# Patient Record
Sex: MALE | NOT HISPANIC OR LATINO | Employment: FULL TIME
[De-identification: names, ages, dates, MRNs, and addresses within clinical notes are randomized per-mention and may not be internally consistent; named-entity substitution may affect disease eponyms.]

---
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
## 2024-02-14 ENCOUNTER — OFFICE VISIT (OUTPATIENT)
Dept: RHEUMATOLOGY | Facility: CLINIC | Age: 19
End: 2024-02-14
Payer: COMMERCIAL

## 2024-02-14 VITALS
BODY MASS INDEX: 31.54 KG/M2 | SYSTOLIC BLOOD PRESSURE: 131 MMHG | HEART RATE: 72 BPM | HEIGHT: 69 IN | DIASTOLIC BLOOD PRESSURE: 81 MMHG | WEIGHT: 212.96 LBS

## 2024-02-14 DIAGNOSIS — Z01.00 ROUTINE EYE EXAM: ICD-10-CM

## 2024-02-14 DIAGNOSIS — M08.80 JIA (JUVENILE IDIOPATHIC ARTHRITIS) (H): Primary | ICD-10-CM

## 2024-02-14 DIAGNOSIS — Z79.1 NSAID LONG-TERM USE: ICD-10-CM

## 2024-02-14 PROCEDURE — 99214 OFFICE O/P EST MOD 30 MIN: CPT | Performed by: STUDENT IN AN ORGANIZED HEALTH CARE EDUCATION/TRAINING PROGRAM

## 2024-02-14 NOTE — PATIENT INSTRUCTIONS
Reid Zelaya saw Dr. Chan on February 14, 2024 for a follow-up  visit regarding his ankle arthritis.     Overall Assessment: Reid is doing well.     Plan:    Labs: No labs today    Imaging: None    Medications: Continue meloxicam daily    Referrals: None    Eye exams: You need a slit lamp eye exam. You're overdue    Follow up with us in: 3-4 months in clinic     Thank you for allowing me to participate in Reid's care.  If there are any questions or concerns, please do not hesitate to contact us at the phone numbers below.    Viridiana Chan MD, MPH   of Pediatrics  Division of Rheumatology, Allergy, and Immunology   Thank you for choosing [facility]. It was a pleasure to see you for your office visit today.     If you have any questions or scheduling needs during regular office hours, please call: [phone]  If urgent concerns arise after hours, you can call [phone] and ask to speak to the pediatric specialist on call.   If you need to schedule Imaging/Radiology tests, please call: [phone]  [employer] messages are for routine communication and questions and are usually answered within 48-72 hours. If you have an urgent concern or require sooner response, please call us.  Outside lab and imaging results should be faxed to [phone].  If you go to a lab outside of [facility] we will not automatically get those results. You will need to ask to have them faxed.   You may receive a survey regarding your experience with the clinic today. We would appreciate your feedback.   We encourage to you make your follow-up today to ensure a timely appointment. If you are unable to do so please reach out to [phone] as soon as possible.       If you had any blood work, imaging or other tests completed today:  Normal test results will be mailed to your home address in a letter.  Abnormal results will be communicated to you via phone call/letter.  Please allow up to 1-2 weeks for  processing and interpretation of most lab work.

## 2024-02-14 NOTE — LETTER
2/14/2024      RE: Reid Zelaya  [address] [facility] [address]     Dear Colleague,    Thank you for the opportunity to participate in the care of your patient, Reid Zelaya, at the [facility] [place] at [facility]. Please see a copy of my visit note below.        Rheumatology History:   Date of symptom onset: 5/1/2021  Date of first visit to center: 1/31/2022  Date of KEDAR diagnosis: 1/31/2022  ILAR category: persistent oligoarticular  SEBASTIAN Status: negative   RF Status: negative   CCP Status: not done   HLA-B27 Status: negative        Ophthalmology History:   Iritis/Uveitis Comorbidity: no   Date of last eye exam: 1/1/2022          Medications:   As of completion of this visit:  Current Outpatient Medications   Medication Sig Dispense Refill    meloxicam (MOBIC) 15 MG tablet Take 1 tablet (15 mg) by mouth daily 90 tablet 3    adalimumab (HUMIRA *CF*) 40 MG/0.4ML prefilled syringe kit Inject 0.4 mLs (40 mg) Subcutaneous every 7 days (Patient not taking: Reported on 11/27/2023) 4 each 11         Problem list:     Patient Active Problem List    Diagnosis Date Noted    Adalimumab (Humira) long-term use 11/08/2022     Priority: Medium    Long term methotrexate user 04/25/2022     Priority: Medium    At risk for uveitis 04/25/2022     Priority: Medium     Needs a yearly eye exam to monitor for uveitis      KEDAR (juvenile idiopathic arthritis) (H) 02/02/2022     Priority: Medium    NSAID long-term use 02/02/2022     Priority: Medium          Subjective:   Reid is a 18 year old male who was seen in Pediatric Rheumatology clinic today for follow up.  Reid was last seen in our clinic on 11/27/23 and returns today accompanied by his dad.  The primary encounter diagnosis was KEDAR (juvenile idiopathic arthritis) (H). Diagnoses of At risk for uveitis and NSAID long-term use were also pertinent to this visit.      Reid is doing  "better since restarting his daily meloxicam. He is no longer having problems at work. He is not having any side effects from the meloxicam. He has not had return of swelling of his feet. He does not have any new joint involvement. He does not think he needs to restart Humira as the meloxicam is working well.     He has not had a recent eye exam.     Prescribed medications have been administered regularly, without missed doses.  Medications have been tolerated well, without side effects.    Comprehensive Review of Systems is otherwise negative.         Examination:   Blood pressure 131/81, pulse 72, height 1.762 m (5' 9.37\"), weight 96.6 kg (212 lb 15.4 oz).  96 %ile (Z= 1.71) based on CDC (Boys, 2-20 Years) weight-for-age data using vitals from 2/14/2024.  Blood pressure %juan jose are not available for patients who are 18 years or older.  Body surface area is 2.17 meters squared.     Gen: Well appearing; cooperative. No acute distress.  Head: Normal head and hair.  Eyes: No scleral injection, pupils normal.  Nose: No deformity, no rhinorrhea or congestion. No sores.  Mouth: Normal teeth and gums. Moist mucus membranes.   Lymphatics: No cervical, supraclavicular lymphadenopathy.  Lungs: No increased work of breathing. Lungs clear to auscultation bilaterally.  Heart: Regular rate and rhythm. No murmurs. Normal S1/S2. Normal peripheral perfusion.  Abdomen: Soft, non-tender, non-distended. No hepatosplenomegaly.  Skin: No rashes or lesions.  Neuro: Alert, interactive. Answers questions appropriately. CN intact. Normal strength and tone.   MSK: No evidence of current synovitis/arthritis of the cervical spine, TMJ, sternoclavicular, acromioclavicular, glenohumeral, elbow, wrists, finger, sacroiliac, hip, knee, ankle, or toe joints. No tendonitis or bursitis. No enthesitis.  No leg length discrepancy. Gait is normal with walking and running.          Last Lab Results:     No visits with results within 1 Day(s) from this visit. "   Latest known visit with results is:   Office Visit on 11/27/2023   Component Date Value    Creatinine 11/27/2023 0.75     GFR Estimate 11/27/2023 >90     Protein Total 11/27/2023 7.3     Albumin 11/27/2023 4.6     Bilirubin Total 11/27/2023 0.6     Alkaline Phosphatase 11/27/2023 90     AST 11/27/2023 23     ALT 11/27/2023 25     Bilirubin Direct 11/27/2023 <0.20     Color Urine 11/27/2023 Yellow     Appearance Urine 11/27/2023 Clear     Glucose Urine 11/27/2023 Negative     Bilirubin Urine 11/27/2023 Negative     Ketones Urine 11/27/2023 Negative     Specific Gravity Urine 11/27/2023 1.025     Blood Urine 11/27/2023 Negative     pH Urine 11/27/2023 7.5 (H)     Protein Albumin Urine 11/27/2023 Negative     Urobilinogen Urine 11/27/2023 2.0     Nitrite Urine 11/27/2023 Negative     Leukocyte Esterase Urine 11/27/2023 Negative     Mucus Urine 11/27/2023 Present (A)     RBC Urine 11/27/2023 1     WBC Urine 11/27/2023 2     WBC Count 11/27/2023 6.7     RBC Count 11/27/2023 5.42     Hemoglobin 11/27/2023 14.7     Hematocrit 11/27/2023 43.1     MCV 11/27/2023 80     MCH 11/27/2023 27.1     MCHC 11/27/2023 34.1     RDW 11/27/2023 12.7     Platelet Count 11/27/2023 307     % Neutrophils 11/27/2023 52     % Lymphocytes 11/27/2023 37     % Monocytes 11/27/2023 8     % Eosinophils 11/27/2023 2     % Basophils 11/27/2023 1     % Immature Granulocytes 11/27/2023 0     NRBCs per 100 WBC 11/27/2023 0     Absolute Neutrophils 11/27/2023 3.5     Absolute Lymphocytes 11/27/2023 2.4     Absolute Monocytes 11/27/2023 0.6     Absolute Eosinophils 11/27/2023 0.2     Absolute Basophils 11/27/2023 0.0     Absolute Immature Granul* 11/27/2023 0.0     Absolute NRBCs 11/27/2023 0.0           Assessment:   Reid is a 18 year old male with 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     Reid's presentation has always been a little bit atypical with his predominant physical exam  finding being decreased ROM of his ankles. His exam is normal today. He is reportedly not having pain since restarting meloxicam therapy. In the past, he's needed Humira to help resolve symptoms, and it's possible that over time, his arthritis will flare again and require restarting Humira. However, today, he's doing well. We have had evidence with past flares that Reid's increase in symptoms does correspond with increased findings on MRI, so we can use pain/symptoms as a guide for current flare activity.     Labs performed in November do not reflect toxicity due to medication.         Plan:   Labs: No labs today. Next labs due in May 2024.   Imaging: None  Medications: Continue meloxicam daily. I am keeping the Humira prescription active in case symptoms increase  Referrals: None  Eye exams: Overdue for a slit lamp eye exam  Return in about 3 months (around 5/14/2024).     If there are any new questions or concerns, I would be glad to help and can be reached through our main office at [phone] or our paging  at [phone].  Assessment requiring an independent historian(s) - family - dad  Prescription drug management  30 minutes spent by me on the date of the encounter doing chart review, history and exam, documentation and further activities per the note     Viridiana Chan MD MPH   of Pediatrics  Division of Rheumatology, Allergy, and Immunology     CC  Patient Care Team:  Na Oneal MD as PCP - General    Copy to patient  EMANUEL MORRIS, RAUL  [address] [facility] [address]

## 2024-02-14 NOTE — PROGRESS NOTES
Rheumatology History:   Date of symptom onset: 5/1/2021  Date of first visit to center: 1/31/2022  Date of KEDAR diagnosis: 1/31/2022  ILAR category: persistent oligoarticular  SEBASTIAN Status: negative   RF Status: negative   CCP Status: not done   HLA-B27 Status: negative        Ophthalmology History:   Iritis/Uveitis Comorbidity: no   Date of last eye exam: 1/1/2022          Medications:   As of completion of this visit:  Current Outpatient Medications   Medication Sig Dispense Refill    meloxicam (MOBIC) 15 MG tablet Take 1 tablet (15 mg) by mouth daily 90 tablet 3    adalimumab (HUMIRA *CF*) 40 MG/0.4ML prefilled syringe kit Inject 0.4 mLs (40 mg) Subcutaneous every 7 days (Patient not taking: Reported on 11/27/2023) 4 each 11         Problem list:     Patient Active Problem List    Diagnosis Date Noted    Adalimumab (Humira) long-term use 11/08/2022     Priority: Medium    Long term methotrexate user 04/25/2022     Priority: Medium    At risk for uveitis 04/25/2022     Priority: Medium     Needs a yearly eye exam to monitor for uveitis      KEDAR (juvenile idiopathic arthritis) (H) 02/02/2022     Priority: Medium    NSAID long-term use 02/02/2022     Priority: Medium          Subjective:   Reid is a 18 year old male who was seen in Pediatric Rheumatology clinic today for follow up.  Reid was last seen in our clinic on 11/27/23 and returns today accompanied by his dad.  The primary encounter diagnosis was KEDAR (juvenile idiopathic arthritis) (H). Diagnoses of At risk for uveitis and NSAID long-term use were also pertinent to this visit.      Reid is doing better since restarting his daily meloxicam. He is no longer having problems at work. He is not having any side effects from the meloxicam. He has not had return of swelling of his feet. He does not have any new joint involvement. He does not think he needs to restart Humira as the meloxicam is working well.     He has not had a recent eye exam.     Prescribed  "medications have been administered regularly, without missed doses.  Medications have been tolerated well, without side effects.    Comprehensive Review of Systems is otherwise negative.         Examination:   Blood pressure 131/81, pulse 72, height 1.762 m (5' 9.37\"), weight 96.6 kg (212 lb 15.4 oz).  96 %ile (Z= 1.71) based on [facility] (Boys, 2-20 Years) weight-for-age data using vitals from 2/14/2024.  Blood pressure %juan jose are not available for patients who are 18 years or older.  Body surface area is 2.17 meters squared.     Gen: Well appearing; cooperative. No acute distress.  Head: Normal head and hair.  Eyes: No scleral injection, pupils normal.  Nose: No deformity, no rhinorrhea or congestion. No sores.  Mouth: Normal teeth and gums. Moist mucus membranes.   Lymphatics: No cervical, supraclavicular lymphadenopathy.  Lungs: No increased work of breathing. Lungs clear to auscultation bilaterally.  Heart: Regular rate and rhythm. No murmurs. Normal S1/S2. Normal peripheral perfusion.  Abdomen: Soft, non-tender, non-distended. No hepatosplenomegaly.  Skin: No rashes or lesions.  Neuro: Alert, interactive. Answers questions appropriately. CN intact. Normal strength and tone.   MSK: No evidence of current synovitis/arthritis of the cervical spine, TMJ, sternoclavicular, acromioclavicular, glenohumeral, elbow, wrists, finger, sacroiliac, hip, knee, ankle, or toe joints. No tendonitis or bursitis. No enthesitis.  No leg length discrepancy. Gait is normal with walking and running.          Last Lab Results:     No visits with results within 1 Day(s) from this visit.   Latest known visit with results is:   Office Visit on 11/27/2023   Component Date Value    Creatinine 11/27/2023 0.75     GFR Estimate 11/27/2023 >90     Protein Total 11/27/2023 7.3     Albumin 11/27/2023 4.6     Bilirubin Total 11/27/2023 0.6     Alkaline Phosphatase 11/27/2023 90     AST 11/27/2023 23     ALT 11/27/2023 25     Bilirubin Direct 11/27/2023 " <0.20     Color Urine 11/27/2023 Yellow     Appearance Urine 11/27/2023 Clear     Glucose Urine 11/27/2023 Negative     Bilirubin Urine 11/27/2023 Negative     Ketones Urine 11/27/2023 Negative     Specific Gravity Urine 11/27/2023 1.025     Blood Urine 11/27/2023 Negative     pH Urine 11/27/2023 7.5 (H)     Protein Albumin Urine 11/27/2023 Negative     Urobilinogen Urine 11/27/2023 2.0     Nitrite Urine 11/27/2023 Negative     Leukocyte Esterase Urine 11/27/2023 Negative     Mucus Urine 11/27/2023 Present (A)     RBC Urine 11/27/2023 1     WBC Urine 11/27/2023 2     WBC Count 11/27/2023 6.7     RBC Count 11/27/2023 5.42     Hemoglobin 11/27/2023 14.7     Hematocrit 11/27/2023 43.1     MCV 11/27/2023 80     MCH 11/27/2023 27.1     MCHC 11/27/2023 34.1     RDW 11/27/2023 12.7     Platelet Count 11/27/2023 307     % Neutrophils 11/27/2023 52     % Lymphocytes 11/27/2023 37     % Monocytes 11/27/2023 8     % Eosinophils 11/27/2023 2     % Basophils 11/27/2023 1     % Immature Granulocytes 11/27/2023 0     NRBCs per 100 WBC 11/27/2023 0     Absolute Neutrophils 11/27/2023 3.5     Absolute Lymphocytes 11/27/2023 2.4     Absolute Monocytes 11/27/2023 0.6     Absolute Eosinophils 11/27/2023 0.2     Absolute Basophils 11/27/2023 0.0     Absolute Immature Granul* 11/27/2023 0.0     Absolute NRBCs 11/27/2023 0.0           Assessment:   Reid is a 18 year old male with 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     Reid's presentation has always been a little bit atypical with his predominant physical exam finding being decreased ROM of his ankles. His exam is normal today. He is reportedly not having pain since restarting meloxicam therapy. In the past, he's needed Humira to help resolve symptoms, and it's possible that over time, his arthritis will flare again and require restarting Humira. However, today, he's doing well. We have had evidence with past flares  that Reid's increase in symptoms does correspond with increased findings on MRI, so we can use pain/symptoms as a guide for current flare activity.     Labs performed in November do not reflect toxicity due to medication.         Plan:   Labs: No labs today. Next labs due in May 2024.   Imaging: None  Medications: Continue meloxicam daily. I am keeping the Humira prescription active in case symptoms increase  Referrals: None  Eye exams: Overdue for a slit lamp eye exam  Return in about 3 months (around 5/14/2024).     If there are any new questions or concerns, I would be glad to help and can be reached through our main office at [phone] or our paging  at [phone].  Assessment requiring an independent historian(s) - family - dad  Prescription drug management  30 minutes spent by me on the date of the encounter doing chart review, history and exam, documentation and further activities per the note     Viridiana Martell MD MPH   of Pediatrics  Division of Rheumatology, Allergy, and Immunology     CC  Patient Care Team:  Na Oneal MD as PCP - General  Dr. Taylor as Other (see comments) (Podiatry)  Viridiana Martell MD as Fellow (Student in organized health care education/training program)  Viridiana Martell MD as Assigned Pediatric Specialist Provider  VIRIDIANA MARTELL    Copy to patient  EMANUEL MORRISSLYH  [address] [facility] [address]

## 2024-06-11 ENCOUNTER — TELEPHONE (OUTPATIENT)
Dept: RHEUMATOLOGY | Facility: CLINIC | Age: 19
End: 2024-06-11
Payer: COMMERCIAL

## 2024-06-11 NOTE — TELEPHONE ENCOUNTER
06/11 1st attempt. LVM to schedule an overdue follow up visit with the provider.    Offered 06/26 at 11:30 in [facility]    Please assist in scheduling a follow up visit if the patient calls back.    Thanks

## 2024-06-30 ENCOUNTER — HEALTH MAINTENANCE LETTER (OUTPATIENT)
Age: 19
End: 2024-06-30

## 2024-07-02 NOTE — TELEPHONE ENCOUNTER
07/02 2nd attempt. LVM to schedule an overdue follow up visit with the provider.    Offered 07/10 at 9:30 AM in [facility]    Please assist in scheduling a follow up visit if the family calls back.    Thanks

## 2024-10-04 ENCOUNTER — HOSPITAL ENCOUNTER (EMERGENCY)
Facility: CLINIC | Age: 19
Discharge: HOME | End: 2024-10-04
Attending: STUDENT IN AN ORGANIZED HEALTH CARE EDUCATION/TRAINING PROGRAM | Admitting: STUDENT IN AN ORGANIZED HEALTH CARE EDUCATION/TRAINING PROGRAM
Payer: COMMERCIAL

## 2024-10-04 VITALS
DIASTOLIC BLOOD PRESSURE: 93 MMHG | HEART RATE: 69 BPM | HEIGHT: 69 IN | BODY MASS INDEX: 32.58 KG/M2 | RESPIRATION RATE: 18 BRPM | WEIGHT: 220 LBS | TEMPERATURE: 98 F | OXYGEN SATURATION: 100 % | SYSTOLIC BLOOD PRESSURE: 136 MMHG

## 2024-10-04 DIAGNOSIS — S05.02XA ABRASION OF LEFT CORNEA, INITIAL ENCOUNTER: ICD-10-CM

## 2024-10-04 PROCEDURE — 99283 EMERGENCY DEPT VISIT LOW MDM: CPT | Performed by: STUDENT IN AN ORGANIZED HEALTH CARE EDUCATION/TRAINING PROGRAM

## 2024-10-04 RX ORDER — TETRACAINE HYDROCHLORIDE 5 MG/ML
1-2 SOLUTION OPHTHALMIC ONCE
Status: DISCONTINUED | OUTPATIENT
Start: 2024-10-04 | End: 2024-10-04 | Stop reason: HOSPADM

## 2024-10-04 RX ORDER — OFLOXACIN 3 MG/ML
2 SOLUTION/ DROPS OPHTHALMIC
Status: SHIPPED
Start: 2024-10-04 | End: 2024-10-11

## 2024-10-04 RX ORDER — OFLOXACIN 3 MG/ML
2 SOLUTION/ DROPS OPHTHALMIC ONCE
Status: DISCONTINUED | OUTPATIENT
Start: 2024-10-04 | End: 2024-10-04 | Stop reason: HOSPADM

## 2024-10-04 ASSESSMENT — VISUAL ACUITY
OD: 20/20
OS: 20/25

## 2024-10-04 ASSESSMENT — COLUMBIA-SUICIDE SEVERITY RATING SCALE - C-SSRS
6. HAVE YOU EVER DONE ANYTHING, STARTED TO DO ANYTHING, OR PREPARED TO DO ANYTHING TO END YOUR LIFE?: NO
2. HAVE YOU ACTUALLY HAD ANY THOUGHTS OF KILLING YOURSELF IN THE PAST MONTH?: NO
1. IN THE PAST MONTH, HAVE YOU WISHED YOU WERE DEAD OR WISHED YOU COULD GO TO SLEEP AND NOT WAKE UP?: NO

## 2024-10-04 ASSESSMENT — ACTIVITIES OF DAILY LIVING (ADL): ADLS_ACUITY_SCORE: 35

## 2024-10-04 NOTE — Clinical Note
Reid Garcia was seen and treated in our emergency department on 10/4/2024.  He may return to work on 10/06/2024.       If you have any questions or concerns, please don't hesitate to call.      Dilan West MD

## 2024-10-05 NOTE — ED TRIAGE NOTES
Pulling wire in garage last night and wire to L eye. C/o redness and irritation today.      Triage Assessment (Adult)       Row Name 10/04/24 2046          Triage Assessment    Airway WDL WDL        Respiratory WDL    Respiratory WDL WDL        Cardiac WDL    Cardiac WDL WDL

## 2024-10-05 NOTE — DISCHARGE INSTRUCTIONS
You do have a small scratch to your left eye.  This will require antibiotic eyedrops for the next week.  Take them as instructed for the full duration of treatment.    Use Tylenol/ibuprofen for any additional discomfort.  Follow-up with your eye doctor, primary physician for reevaluation.  Return to the emergency department sooner for any new or worsening symptoms.

## 2024-10-05 NOTE — ED PROVIDER NOTES
History     Chief Complaint   Patient presents with    Eye Problem     HPI  Reid Zelaya is a 19 year old male with no relevant medical history who presents for evaluation of an eye injury.  Patient was doing some electrical work, pulling wire in her garage last night when the wire flew forward and struck him in the left eye.  This caused immediate pain and tearing, but he denies any significant vision changes or facial trauma.  Patient was able to sleep without too much difficulty, but he noted some watery discharge today and diffuse redness to the conjunctiva.  Patient otherwise denies having any other injuries or complaints.    Allergies:  Allergies   Allergen Reactions    Sulfa Antibiotics Swelling    Amoxicillin Hives and Rash     Problem List:    Patient Active Problem List    Diagnosis Date Noted    Adalimumab (Humira) long-term use 11/08/2022     Priority: Medium    Long term methotrexate user 04/25/2022     Priority: Medium    At risk for uveitis 04/25/2022     Priority: Medium     Needs a yearly eye exam to monitor for uveitis      KEDAR (juvenile idiopathic arthritis) (H) 02/02/2022     Priority: Medium    NSAID long-term use 02/02/2022     Priority: Medium      Past Medical History:    Past Medical History:   Diagnosis Date    ADHD (attention deficit hyperactivity disorder)     Chronic pain of both feet 2/2/2022     Past Surgical History:    History reviewed. No pertinent surgical history.    Family History:    Family History   Problem Relation Age of Onset    Cataracts Maternal Grandmother         congenital    Diabetes Type 1 Paternal Aunt      Social History:  Marital Status:  Single [1]  Social History     Tobacco Use    Smoking status: Never     Passive exposure: Never    Smokeless tobacco: Never      Medications:    ofloxacin (OCUFLOX) 0.3 % ophthalmic solution  adalimumab (HUMIRA *CF*) 40 MG/0.4ML prefilled syringe kit  meloxicam (MOBIC) 15 MG tablet      Review of Systems   All other systems  "reviewed and are negative.  See HPI.    Physical Exam   BP: (!) 136/93  Pulse: 69  Temp: 98  F (36.7  C)  Resp: 18  Height: 175.3 cm (5' 9\")  Weight: 99.8 kg (220 lb)  SpO2: 100 %    Physical Exam  Vitals and nursing note reviewed.   Constitutional:       General: He is not in acute distress.     Appearance: Normal appearance. He is not toxic-appearing.   HENT:      Head: Normocephalic and atraumatic.      Nose: Nose normal.      Mouth/Throat:      Mouth: Mucous membranes are moist.   Eyes:      General: No scleral icterus.     Extraocular Movements: Extraocular movements intact.      Pupils: Pupils are equal, round, and reactive to light.        Comments: There is mild generalized conjunctival injection/erythema to the left eye.  Otherwise normal on visual inspection.  No evidence of globe rupture or pupillary defect.  Extraocular movements are full and nonpainful, no proptosis.  On Woods lamp examination with fluorescein, patient does have a 2 to 3 mm corneal abrasion just below the pupil of the left eye.  No other evidence of foreign body or abrasion/ulceration.    Visual acuity 20/20 on the right, 20/25 on the left.   Cardiovascular:      Rate and Rhythm: Normal rate and regular rhythm.      Heart sounds: Normal heart sounds.   Pulmonary:      Effort: Pulmonary effort is normal. No respiratory distress.      Breath sounds: Normal breath sounds.   Abdominal:      Palpations: Abdomen is soft.      Tenderness: There is no abdominal tenderness.   Musculoskeletal:         General: No deformity. Normal range of motion.      Cervical back: Neck supple.   Skin:     General: Skin is warm.      Capillary Refill: Capillary refill takes less than 2 seconds.      Coloration: Skin is not pale.      Findings: No erythema or rash.   Neurological:      General: No focal deficit present.      Mental Status: He is alert and oriented to person, place, and time.      Cranial Nerves: No cranial nerve deficit.      Motor: No weakness. "   Psychiatric:         Mood and Affect: Mood normal.       ED Course        Procedures            No results found for this or any previous visit (from the past 24 hour(s)).    Medications   tetracaine (PONTOCAINE) 0.5 % ophthalmic solution 1-2 drop (has no administration in time range)   ofloxacin (OCUFLOX) 0.3 % ophthalmic solution 2 drop (has no administration in time range)     Assessments & Plan (with Medical Decision Making)     I have reviewed the nursing notes.    I have reviewed the findings, diagnosis, plan and need for follow up with the patient.  Medical Decision Making  Reid Zelaya is a 19 year old male with no relevant medical history who presents for evaluation of an eye injury.  Mild hypertension on arrival, vitals otherwise normal.  Exam is significant for mild generalized injection/erythema to the left conjunctiva with no evidence of globe injury, pupillary defect, foreign body, or proptosis.  He did have a 2 to 3 mm corneal abrasion on the left as described and pictured above.  No evidence of foreign body or other abnormalities.  Patient will be discharged home with antibiotic eyedrops, instructed to use Tylenol/ibuprofen for discomfort.  He does not wear contact lenses.  Patient will follow-up with his PCP/eye doctor for follow-up and agrees to return sooner for any new or worsening symptoms.    Discharge Medication List as of 10/4/2024  9:24 PM        START taking these medications    Details   ofloxacin (OCUFLOX) 0.3 % ophthalmic solution Place 2 drops Into the left eye every 2 hours (while awake) for 7 days., No Print Out           Final diagnoses:   Abrasion of left cornea, initial encounter     10/4/2024   [facility] EMERGENCY DEPT       Dilan West MD  10/04/24 2198

## 2024-12-03 DIAGNOSIS — M08.80 JIA (JUVENILE IDIOPATHIC ARTHRITIS) (H): ICD-10-CM

## 2024-12-03 RX ORDER — MELOXICAM 15 MG/1
15 TABLET ORAL DAILY
Qty: 30 TABLET | Refills: 0 | Status: SHIPPED | OUTPATIENT
Start: 2024-12-03

## 2025-02-12 DIAGNOSIS — M08.80 JIA (JUVENILE IDIOPATHIC ARTHRITIS) (H): ICD-10-CM

## 2025-02-12 RX ORDER — MELOXICAM 15 MG/1
15 TABLET ORAL DAILY
Qty: 30 TABLET | Refills: 0 | OUTPATIENT
Start: 2025-02-12

## 2025-07-13 ENCOUNTER — HEALTH MAINTENANCE LETTER (OUTPATIENT)
Age: 20
End: 2025-07-13